# Patient Record
Sex: MALE | Race: WHITE
[De-identification: names, ages, dates, MRNs, and addresses within clinical notes are randomized per-mention and may not be internally consistent; named-entity substitution may affect disease eponyms.]

---

## 2017-03-17 ENCOUNTER — HOSPITAL ENCOUNTER (OUTPATIENT)
Dept: HOSPITAL 45 - C.LAB | Age: 67
Discharge: HOME | End: 2017-03-17
Attending: SPECIALIST
Payer: COMMERCIAL

## 2017-03-17 DIAGNOSIS — E78.5: ICD-10-CM

## 2017-03-17 DIAGNOSIS — I10: ICD-10-CM

## 2017-03-17 DIAGNOSIS — Z01.818: Primary | ICD-10-CM

## 2017-03-17 DIAGNOSIS — R35.1: ICD-10-CM

## 2017-03-17 DIAGNOSIS — K76.0: ICD-10-CM

## 2017-03-17 DIAGNOSIS — N20.0: ICD-10-CM

## 2017-03-17 DIAGNOSIS — Z11.59: ICD-10-CM

## 2017-03-17 LAB
ALBUMIN/GLOB SERPL: 1 {RATIO} (ref 0.9–2)
ALP SERPL-CCNC: 79 U/L (ref 45–117)
ALT SERPL-CCNC: 36 U/L (ref 12–78)
ANION GAP SERPL CALC-SCNC: 7 MMOL/L (ref 3–11)
AST SERPL-CCNC: 21 U/L (ref 15–37)
BASOPHILS # BLD: 0.02 K/UL (ref 0–0.2)
BASOPHILS NFR BLD: 0.3 %
BUN SERPL-MCNC: 24 MG/DL (ref 7–18)
BUN/CREAT SERPL: 25.4 (ref 10–20)
CALCIUM SERPL-MCNC: 8.8 MG/DL (ref 8.5–10.1)
CHLORIDE SERPL-SCNC: 103 MMOL/L (ref 98–107)
CHOLEST/HDLC SERPL: 3.1 {RATIO}
CO2 SERPL-SCNC: 29 MMOL/L (ref 21–32)
COMPLETE: YES
CREAT SERPL-MCNC: 0.96 MG/DL (ref 0.6–1.4)
EOSINOPHIL NFR BLD AUTO: 184 K/UL (ref 130–400)
GLOBULIN SER-MCNC: 3.8 GM/DL (ref 2.5–4)
GLUCOSE SERPL-MCNC: 92 MG/DL (ref 70–99)
GLUCOSE UR QL: 51 MG/DL
HCT VFR BLD CALC: 49 % (ref 42–52)
IG%: 0.3 %
IMM GRANULOCYTES NFR BLD AUTO: 22.3 %
INR PPP: 1 (ref 0.9–1.1)
KETONES UR QL STRIP: 82 MG/DL
LYMPHOCYTES # BLD: 1.76 K/UL (ref 1.2–3.4)
MCH RBC QN AUTO: 33.3 PG (ref 25–34)
MCHC RBC AUTO-ENTMCNC: 34.5 G/DL (ref 32–36)
MCV RBC AUTO: 96.6 FL (ref 80–100)
MONOCYTES NFR BLD: 6.4 %
NEUTROPHILS # BLD AUTO: 1.1 %
NEUTROPHILS NFR BLD AUTO: 69.6 %
NITRITE UR QL STRIP: 133 MG/DL (ref 0–150)
PARTIAL THROMBOPLASTIN RATIO: 1.1
PH UR: 160 MG/DL (ref 0–200)
PMV BLD AUTO: 10.6 FL (ref 7.4–10.4)
POTASSIUM SERPL-SCNC: 4 MMOL/L (ref 3.5–5.1)
PROTHROMBIN TIME: 11.2 SECONDS (ref 9–12)
PSA SERPL-MCNC: 0.35 NG/ML (ref 0–4)
RBC # BLD AUTO: 5.07 M/UL (ref 4.7–6.1)
SODIUM SERPL-SCNC: 139 MMOL/L (ref 136–145)
TSH SERPL-ACNC: 1.69 UIU/ML (ref 0.3–4.5)
VERY LOW DENSITY LIPOPROT CALC: 27 MG/DL
WBC # BLD AUTO: 7.91 K/UL (ref 4.8–10.8)

## 2017-03-17 NOTE — DIAGNOSTIC IMAGING REPORT
TWO VIEW CHEST



CLINICAL HISTORY: Nephrolithiasis. Preoperative examination.



FINDINGS: PA and lateral chest radiographs are compared to chest x-ray and chest

CT dated 6/9/2015. The heart is mildly enlarged. The pulmonary vasculature is

noncongested.  The lungs and pleural spaces are clear. There is no pneumothorax.

The bony thorax appears intact.



IMPRESSION: No active disease in the chest.







Electronically signed by:  Zander Dominguez M.D.

3/17/2017 11:22 AM



Dictated Date/Time:  3/17/2017 11:21 AM

## 2017-06-09 ENCOUNTER — HOSPITAL ENCOUNTER (OUTPATIENT)
Dept: HOSPITAL 45 - C.LAB | Age: 67
Discharge: HOME | End: 2017-06-09
Attending: INTERNAL MEDICINE
Payer: COMMERCIAL

## 2017-06-09 DIAGNOSIS — I10: Primary | ICD-10-CM

## 2017-06-09 LAB
ALBUMIN/GLOB SERPL: 1.2 {RATIO} (ref 0.9–2)
ALP SERPL-CCNC: 68 U/L (ref 45–117)
ALT SERPL-CCNC: 30 U/L (ref 12–78)
ANION GAP SERPL CALC-SCNC: 8 MMOL/L (ref 3–11)
AST SERPL-CCNC: 23 U/L (ref 15–37)
BUN SERPL-MCNC: 32 MG/DL (ref 7–18)
BUN/CREAT SERPL: 26.3 (ref 10–20)
CALCIUM SERPL-MCNC: 9.1 MG/DL (ref 8.5–10.1)
CHLORIDE SERPL-SCNC: 104 MMOL/L (ref 98–107)
CO2 SERPL-SCNC: 29 MMOL/L (ref 21–32)
CREAT SERPL-MCNC: 1.2 MG/DL (ref 0.6–1.4)
GLOBULIN SER-MCNC: 3.4 GM/DL (ref 2.5–4)
GLUCOSE SERPL-MCNC: 85 MG/DL (ref 70–99)
POTASSIUM SERPL-SCNC: 4 MMOL/L (ref 3.5–5.1)
SODIUM SERPL-SCNC: 141 MMOL/L (ref 136–145)

## 2017-06-13 ENCOUNTER — HOSPITAL ENCOUNTER (OUTPATIENT)
Dept: HOSPITAL 45 - C.MRI | Age: 67
Discharge: HOME | End: 2017-06-13
Attending: INTERNAL MEDICINE
Payer: COMMERCIAL

## 2017-06-13 DIAGNOSIS — R42: Primary | ICD-10-CM

## 2017-06-13 NOTE — DIAGNOSTIC IMAGING REPORT
Brain MRA



HISTORY: Mental status change  [id number]



TECHNIQUE: 3-D time-of-flight MRA of the brain was performed without contrast.



COMPARISON STUDY:  None.



FINDINGS: Visualized intracranial internal carotid arteries, distal vertebral

arteries, and basilar artery are widely patent. There is no significant

stenosis, occlusion, or aneurysm seen within the bilateral ACAs, MCAs, or PCAs.



IMPRESSION:  

No significant stenosis, occlusion, or aneurysm within the [place]. 







Electronically signed by:  Tarik Marcelo M.D.

6/13/2017 9:44 AM



Dictated Date/Time:  6/13/2017 9:40 AM

## 2017-06-16 ENCOUNTER — HOSPITAL ENCOUNTER (OUTPATIENT)
Dept: HOSPITAL 45 - C.MRIBC | Age: 67
Discharge: HOME | End: 2017-06-16
Attending: INTERNAL MEDICINE
Payer: COMMERCIAL

## 2017-06-16 DIAGNOSIS — R42: Primary | ICD-10-CM

## 2017-06-16 NOTE — DIAGNOSTIC IMAGING REPORT
MRI OF THE BRAIN WITHOUT AND WITH IV CONTRAST



CLINICAL HISTORY: VERTIGO mental status change



COMPARISON STUDY:  No previous studies for comparison. 



TECHNIQUE:  Utilizing a 1.5 Brianna magnet and dedicated coil, multiplanar,

multiecho imaging of the brain was performed pre and postcontrast

administration.  IV administration of 8 mL of Gadavist contrast was uneventful.



FINDINGS: Diffusion-weighted images are normal. Signal characteristics of the

cerebellar as well as cerebral hemispheres are unremarkable. The ventricular

system is midline. There is no postcontrast enhancement. The sella and

parasellar regions are unremarkable. Internal artery canals are symmetric.



IMPRESSION:  Normal study.







Electronically signed by:  Tarik Marcelo M.D.

6/16/2017 12:47 PM



Dictated Date/Time:  6/16/2017 12:45 PM

## 2017-07-20 ENCOUNTER — HOSPITAL ENCOUNTER (OUTPATIENT)
Dept: HOSPITAL 45 - C.PATHSPEC | Age: 67
Discharge: HOME | End: 2017-07-20
Attending: DERMATOLOGY
Payer: COMMERCIAL

## 2017-07-20 DIAGNOSIS — D23.72: Primary | ICD-10-CM

## 2017-08-11 ENCOUNTER — HOSPITAL ENCOUNTER (OUTPATIENT)
Dept: HOSPITAL 45 - C.LAB | Age: 67
Discharge: HOME | End: 2017-08-11
Attending: SPECIALIST
Payer: COMMERCIAL

## 2017-08-11 DIAGNOSIS — N20.0: Primary | ICD-10-CM

## 2017-08-11 LAB
ALBUMIN/GLOB SERPL: 1.2 {RATIO} (ref 0.9–2)
ALP SERPL-CCNC: 67 U/L (ref 45–117)
ALT SERPL-CCNC: 31 U/L (ref 12–78)
ANION GAP SERPL CALC-SCNC: 8 MMOL/L (ref 3–11)
AST SERPL-CCNC: 26 U/L (ref 15–37)
BUN SERPL-MCNC: 27 MG/DL (ref 7–18)
BUN/CREAT SERPL: 24.2 (ref 10–20)
CALCIUM SERPL-MCNC: 8.9 MG/DL (ref 8.5–10.1)
CHLORIDE SERPL-SCNC: 106 MMOL/L (ref 98–107)
CO2 SERPL-SCNC: 25 MMOL/L (ref 21–32)
CREAT SERPL-MCNC: 1.1 MG/DL (ref 0.6–1.4)
GLOBULIN SER-MCNC: 3.4 GM/DL (ref 2.5–4)
GLUCOSE SERPL-MCNC: 90 MG/DL (ref 70–99)
MAGNESIUM SERPL-MCNC: 2.2 MG/DL (ref 1.8–2.4)
PHOSPHATE SERPL-MCNC: 2 MG/DL (ref 2.5–4.9)
POTASSIUM SERPL-SCNC: 4.1 MMOL/L (ref 3.5–5.1)
SODIUM SERPL-SCNC: 139 MMOL/L (ref 136–145)
URATE SERPL-MCNC: 8.5 MG/DL (ref 2.6–7.2)

## 2017-12-08 LAB
BASOPHILS # BLD: 0.02 K/UL (ref 0–0.2)
BASOPHILS NFR BLD: 0.4 %
BUN SERPL-MCNC: 28 MG/DL (ref 7–18)
CALCIUM SERPL-MCNC: 9 MG/DL (ref 8.5–10.1)
CO2 SERPL-SCNC: 28 MMOL/L (ref 21–32)
CREAT SERPL-MCNC: 0.98 MG/DL (ref 0.6–1.4)
EOS ABS #: 0.05 K/UL (ref 0–0.5)
EOSINOPHIL NFR BLD AUTO: 152 K/UL (ref 130–400)
GLUCOSE SERPL-MCNC: 91 MG/DL (ref 70–99)
HCT VFR BLD CALC: 48.7 % (ref 42–52)
HGB BLD-MCNC: 16.4 G/DL (ref 14–18)
IG#: 0.01 K/UL (ref 0–0.02)
IMM GRANULOCYTES NFR BLD AUTO: 35.2 %
LYMPHOCYTES # BLD: 1.8 K/UL (ref 1.2–3.4)
MCH RBC QN AUTO: 33.2 PG (ref 25–34)
MCHC RBC AUTO-ENTMCNC: 33.7 G/DL (ref 32–36)
MCV RBC AUTO: 98.6 FL (ref 80–100)
MONO ABS #: 0.51 K/UL (ref 0.11–0.59)
MONOCYTES NFR BLD: 10 %
NEUT ABS #: 2.73 K/UL (ref 1.4–6.5)
NEUTROPHILS # BLD AUTO: 1 %
NEUTROPHILS NFR BLD AUTO: 53.2 %
PMV BLD AUTO: 10.7 FL (ref 7.4–10.4)
POTASSIUM SERPL-SCNC: 3.8 MMOL/L (ref 3.5–5.1)
RED CELL DISTRIBUTION WIDTH CV: 13.3 % (ref 11.5–14.5)
RED CELL DISTRIBUTION WIDTH SD: 47.8 FL (ref 36.4–46.3)
SODIUM SERPL-SCNC: 140 MMOL/L (ref 136–145)
WBC # BLD AUTO: 5.12 K/UL (ref 4.8–10.8)

## 2017-12-08 NOTE — PAT MEDICATION INSTRUCTIONS
Service Date


Dec 8, 2017.





Current Home Medication List


Acetaminophen (Tylenol Arthritis Ext Rel), 650 MG PO PRN


Allopurinol (Zyloprim), 300 MG PO QAM


Doxylamine Succinate (Sleep) (Unisom), 12.5 MG PO HS PRN for RN


Ibuprofen (Advil), 200 MG PO PRN


Lisinopril/Hctz (Prinzide 20-25MG), 0.5 TAB PO QAM


Melatonin (Melatonin Maximum Strengt), 1 TAB PO HS PRN for PRN


Multivitamin (Multivitamin), 0.5 TAB PO BID


Simvastatin (Zocor), 20 MG PO QPM


[potassium citrate], 20 MEQ PO BID





Medication Instructions


For Your Scheduled Surgery 








- Check with surgeon for instructions: 


Ibuprofen (Advil), 200 MG PO PRN








- Hold the following medications the morning of surgery:


[potassium citrate], 20 MEQ PO BID


Lisinopril/Hctz (Prinzide 20-25MG), 0.5 TAB PO QAM


Multivitamin (Multivitamin), 0.5 TAB PO BID








- Take the following medications the morning of surgery with a sip of water:


Acetaminophen (Tylenol Arthritis Ext Rel), 650 MG PO PRN (okay to take up to 4 

hours prior to surgery if needed)


Allopurinol (Zyloprim), 300 MG PO QAM








- Take the following medications as scheduled the night before surgery:


Doxylamine Succinate (Sleep) (Unisom), 12.5 MG PO HS PRN for RN (if needed)


[potassium citrate], 20 MEQ PO BID


Simvastatin (Zocor), 20 MG PO QPM


Melatonin (Melatonin Maximum Strengt), 1 TAB PO HS PRN for PRN (if needed)


Multivitamin (Multivitamin), 0.5 TAB PO BID








If you have any questions please call us at [phone] or [phone] or 

[phone]

## 2017-12-21 ENCOUNTER — HOSPITAL ENCOUNTER (OUTPATIENT)
Dept: HOSPITAL 45 - C.ACU | Age: 67
Setting detail: OBSERVATION
LOS: 1 days | Discharge: HOME | End: 2017-12-22
Attending: SURGERY | Admitting: SURGERY
Payer: COMMERCIAL

## 2017-12-21 VITALS
SYSTOLIC BLOOD PRESSURE: 153 MMHG | HEART RATE: 53 BPM | OXYGEN SATURATION: 95 % | TEMPERATURE: 97.7 F | DIASTOLIC BLOOD PRESSURE: 86 MMHG

## 2017-12-21 VITALS — HEART RATE: 48 BPM | OXYGEN SATURATION: 96 % | SYSTOLIC BLOOD PRESSURE: 163 MMHG | DIASTOLIC BLOOD PRESSURE: 88 MMHG

## 2017-12-21 VITALS
OXYGEN SATURATION: 92 % | SYSTOLIC BLOOD PRESSURE: 100 MMHG | DIASTOLIC BLOOD PRESSURE: 52 MMHG | TEMPERATURE: 98.42 F | HEART RATE: 60 BPM

## 2017-12-21 VITALS — DIASTOLIC BLOOD PRESSURE: 83 MMHG | HEART RATE: 46 BPM | SYSTOLIC BLOOD PRESSURE: 138 MMHG | OXYGEN SATURATION: 94 %

## 2017-12-21 VITALS — SYSTOLIC BLOOD PRESSURE: 137 MMHG | DIASTOLIC BLOOD PRESSURE: 78 MMHG | HEART RATE: 64 BPM | OXYGEN SATURATION: 94 %

## 2017-12-21 VITALS
SYSTOLIC BLOOD PRESSURE: 154 MMHG | HEART RATE: 62 BPM | TEMPERATURE: 97.88 F | OXYGEN SATURATION: 95 % | DIASTOLIC BLOOD PRESSURE: 72 MMHG

## 2017-12-21 VITALS — OXYGEN SATURATION: 93 % | DIASTOLIC BLOOD PRESSURE: 85 MMHG | SYSTOLIC BLOOD PRESSURE: 178 MMHG | HEART RATE: 65 BPM

## 2017-12-21 VITALS
HEIGHT: 70 IN | BODY MASS INDEX: 33.74 KG/M2 | WEIGHT: 235.67 LBS | BODY MASS INDEX: 33.74 KG/M2 | BODY MASS INDEX: 33.74 KG/M2 | WEIGHT: 235.67 LBS | HEIGHT: 70 IN

## 2017-12-21 VITALS
OXYGEN SATURATION: 92 % | TEMPERATURE: 99.14 F | HEART RATE: 73 BPM | DIASTOLIC BLOOD PRESSURE: 77 MMHG | SYSTOLIC BLOOD PRESSURE: 132 MMHG

## 2017-12-21 DIAGNOSIS — Z84.1: ICD-10-CM

## 2017-12-21 DIAGNOSIS — K40.20: Primary | ICD-10-CM

## 2017-12-21 DIAGNOSIS — Z82.49: ICD-10-CM

## 2017-12-21 DIAGNOSIS — E78.5: ICD-10-CM

## 2017-12-21 DIAGNOSIS — D17.6: ICD-10-CM

## 2017-12-21 DIAGNOSIS — E04.2: ICD-10-CM

## 2017-12-21 DIAGNOSIS — I10: ICD-10-CM

## 2017-12-21 DIAGNOSIS — G47.33: ICD-10-CM

## 2017-12-21 DIAGNOSIS — Z80.42: ICD-10-CM

## 2017-12-21 DIAGNOSIS — H91.90: ICD-10-CM

## 2017-12-21 DIAGNOSIS — M16.9: ICD-10-CM

## 2017-12-21 DIAGNOSIS — Z79.899: ICD-10-CM

## 2017-12-21 RX ADMIN — BUPIVACAINE HYDROCHLORIDE ONE ML: 5 INJECTION, SOLUTION EPIDURAL; INTRACAUDAL; PERINEURAL at 10:16

## 2017-12-21 RX ADMIN — POTASSIUM CITRATE SCH MEQ: 10 TABLET ORAL at 21:32

## 2017-12-21 RX ADMIN — BUPIVACAINE HYDROCHLORIDE ONE ML: 5 INJECTION, SOLUTION EPIDURAL; INTRACAUDAL; PERINEURAL at 10:15

## 2017-12-21 RX ADMIN — LISINOPRIL AND HYDROCHLOROTHIAZIDE SCH TAB: 25; 20 TABLET ORAL at 15:08

## 2017-12-21 NOTE — HISTORY & PHYSICAL BRIDGE NOTE
H&P Re-Evaluation


Bridge Note:


I have examined the patient, reviewed the History & Physical and in the 

interval since the performance of the History & Physical I have noted the 

following changes of clinical significance: No changes noted pt marked, SO at 

bedside, all questions answered

## 2017-12-21 NOTE — DISCHARGE INSTRUCTIONS
Discharge Instructions


Date of Service


Dec 21, 2017.





Visit


Reason for Visit:  Bilateral Inguinal Hernia





Discharge


Discharge Diagnosis / Problem:  hernia repair





Discharge Goals


Goal(s):  Decrease discomfort





Activity Recommendations


Activity Limitations:  as noted below


Lifting Limitations:  no more than 10 pounds


Shower/Bathe:  tomorrow


Driving or Machine Use:  1 week





Anesthesia


.





Post Anesthesia Instructions:





If you have had General Anesthesia or IV Sedation:





*  Do not drive today.


*  Resume driving when surgeon permits.


*  Do not make important decisions or sign legal documents today.


*  Call surgeon for:





   1.  Temperature elevations greater than 101 degrees F.


   2.  Uncontrollable pain.


   3.  Excessive bleeding.


   4.  Persistent nausea and vomiting.


   5.  Medication intolerance (nausea, vomiting or rash).





*  For nausea and vomiting use only clear liquids such as: tea, soda, bouillon 

until nausea subsides, then gradually increase diet as tolerated.





*  If you have any concerns or questions, call your surgeon's office.  If 

physician is unavailable and it is an emergency, call 911 or go to the nearest 

emergency room.





.





Instructions / Follow-Up


Instructions / Follow-Up


Dr. Hemphill in 1 week, call [phone] if you do not already have an appt or 

if you have any questions





Diet Recommendations


Recommended Home Diet:  no limitations





Pending Studies


Studies pending at discharge:  no





Medical Emergencies








.


Who to Call and When:





Medical Emergencies:  If at any time you feel your situation is an emergency, 

please call 911 immediately.





.





Non-Emergent Contact


Non-Emergency issues call your:  Surgeon


Call Non-Emergent contact if:  you have a fever, temperature is above 101.5, 

your pain is not controlled, wound has increased redness, you have any 

medication questions





.


.








"Provider Documentation" section prepared by Gallito Gonzalez.








.





[place] Drug Monitoring Program


Search Results:  no issues identified

## 2017-12-21 NOTE — OPERATIVE REPORT
DATE OF OPERATION:  12/21/2017

 

PREOPERATIVE DIAGNOSIS:  Bilateral inguinal hernia.

 

POSTOPERATIVE DIAGNOSIS:  Same with left indirect (sliding) hernia and direct

hernia, lipoma of the cord, right direct hernia and lipoma of the cord.

 

PROCEDURE:  Bilateral inguinal hernia repairs, tension free with Marlex mesh

and excision lipoma of the cord.

 

SURGEON:  Dr. Hemphill.  

 

FIRST ASSISTANT:  Micah Gonzalez PA-C.  

 

OPERATION AND FINDINGS: 

 

SUMMARY:  The patient was brought into the operating room theater, Corona

catheter had been inserted.   The abdomen was prepped with Betadine scrubbing

solution and properly draped.  We used a skin marker to lamberto the skin

incision prior to making the incision and prior to  prepping the patient. 

Incision was made first in the left lower quadrant, so the patient had more

symptomatic hernia  there, larger hernia.  The incision was approximately 4

inches long, deepened through subcutaneous tissue down to the external

oblique.  Some vessels were ligated in the subQ with 2-0 silk.  External

oblique was opened along the course of its fibers in the external ring.  What

I identified at this time was that we elevated the cord and its structures,

the patient had a significant amount of tissue going through, pretty much no

wall left in the inguinal canal and also as we dissected out, we were able to

find an indirect hernia which showed a sigmoid colon sliding component to the

area.  That opening that we had made was closed with running chromic suture,

therefore we did  not dissect out and cut  out any of the lateral attachments

to the hernial sac.  We freed it up sequentially from the surrounding tissue,

identified the cord and its structures, adherent to the hernia sac over a

Penrose drain.  Once we freed this up, we also identified the patient had a

lipoma of the cord coming through which we ligated at its  base at the

internal ring and resected it.  Once this had been completed, then we brought

in a sheet of Marlex mesh onto the field, a large sheet that we used for both

hernias.  At this point prior to repairing the left inguinal area since he

had a significant amount of direct defect we imbricated some transversalis

fibers with interrupted 3-0 silk suture to really get it  out of the way

reconstructing the internal ring pretty much that could only accommodate a

hemostat.  There was no nervous tissue on the left side that was easily

identifiable.  There was one fine nerve  that seemed to be incorporated and

strangulated as it went through the external oblique.  Rather  than leave it 

I decided to sacrifice it.  The mesh was brought up on the field and sutured

with 2-0 Prolene suture.  The symphysis pubis, shelving portion inguinal

ligament, above the conjoined tendon circumferentially around to reconstruct

the internal ring that could only accommodate the tip of a hemostat.  The

fascia pretty much was placed subfascially external oblique, 3-0 silk was

used to the external oblique fascia over the mesh.  2-0 Vicryl was used for

the subQ.  Staples we didn't use at this time, we will wait until the end to

close the skin incision.  When this was  completed the right lower quadrant 

was approached in a similar fashion using preemptive local analgesia with

0.5% Marcaine infiltrated the skin only  2 fingerbreadths medial anterior to

the iliac crest,  made an incision which we tried to make a mirror image to

the left lower quadrant, deepened through subcutaneous tissue.  Some larger

venous plexus were ligated with 2-0 silk sutures.  At this point we dissected

down onto the external oblique.  The external oblique was opened along the

course of its fibers to the external ring.  We could identify that the

patient had an incarcerated omental fat at the external ring.  This is

returned that would be a large lipoma of the cord, it actually had gone

through  and had been longstanding in the external oblique fascia and the 

cord structures.  We elevated this, we identified that the cord over a

Penrose drain, identified the patient had a direct defect.  There is no

indirect component except a lipoma which we resected,  ligating its base with

2-0 silk suture.  At this point, we then brought in a sheet of Marlex mesh

and sutured it onto the symphysis pubis, shelving portion and conjoined

tendon with 2-0 Prolene suture.  Prior to doing that, to get the direct

defect which the patient, he did not have an indirect hernia and pushed

everything into the  preperitoneal area by approximating some transversalis

fascia.  The mesh was adherent, cut tension free  type.  The external oblique

was closed  mesh and the cord structure with 2-0 silk suture, 3-0 Dexon and

staples for skin edges bilaterally.  The procedure was tolerated well by the

patient.  Estimated blood loss approximately 10 mL for both sides total and

was taken to recovery room in good condition.

 

 

I attest to the content of the Intraoperative Record and any orders documented 
therein. Any exceptions are noted below.

 

 

 

MTDD

## 2017-12-21 NOTE — [facility] OPERATIVE REPORT
Operative Report


Operative Date


Dec 21, 2017.





Pre-Operative Diagnosis





Bilateral inguinal hernias without obstruction or gangrene





Post-Operative Diagnosis





Bilateral inguinal hernias without obstruction or gangrene left direct


with indirect sliding and lipoma cord, right direct and liupoma cord





Procedure(s) Performed





Open repair left direct,  excision lipoma of cord and indirect sliding


left inguinal


hernia with mesh and open repair of direct right inguinal hernia and


direct and excision


lipoma of cord with mesh





Surgeon


Dr. Ren Hemphill





Assistant Surgeon(s)


Gallito Gonzalez PA-C





Estimated Blood Loss


10 mL





Findings


large left direct and sliding ind hernia and lipoma cord, right direct and 

lipoma cord





Specimens





Microbiology





#1. Cath, Urine





Routine C/S, Gram Stain, Anerobic/Aerobic, Fungal/Yeast





Sent to lab at 0840, carried by OR aide.











Permanent specimens





A: Left Inguinal Hernia Sac





B: Lipoma of Cord, Right





Anesthesia


local block .5 % marcAINE PLAIN (30CC TOTAL) AND GENERAL ANESTHESIA


I attest to the content of the Intraoperative Record and any orders documented 

therein.  Any exceptions are noted below.

## 2017-12-21 NOTE — NUR
OBS/ID: Patient is alert and oriented. Vitals stable on RA. Disconnected IVF per order. 
Patient is voiding adequately. (I) in room without concern. Tolerating diet without issue. B 
groin dressing intact, R dressing without noted drainage which seems to be 
controlled/stopping as drainage is darkening/drying. Denies pain complaints. Likely 
discharge to home tomorrow. Will continue to monitor.

## 2017-12-21 NOTE — ANESTHESIOLOGY PROGRESS NOTE
Anesthesia Post Op Note


Date & Time


Dec 21, 2017 at 11:07





Vital Signs


Pain Intensity:  0





Vital Signs Past 12 Hours








  Date Time  Temp Pulse Resp B/P (MAP) Pulse Ox O2 Delivery O2 Flow Rate FiO2


 


12/21/17 11:00  52 12 133/76 98 Oxymask 3 


 


12/21/17 10:50  54 12 147/78 97 Oxymask 3 


 


12/21/17 10:40  59 12 138/76 95 Oxymask 5 


 


12/21/17 10:30 36.4 62 12 140/82 99 Oxymask 5 


 


12/21/17 05:42 36.6 62 18 154/72 (99) 95 Room Air  











Notes


Mental Status:  alert / awake / arousable, participated in evaluation


Pt Amnestic to Procedure:  Yes


Nausea / Vomiting:  adequately controlled


Pain:  adequately controlled


Airway Patency, RR, SpO2:  stable & adequate


BP & HR:  stable & adequate


Hydration State:  stable & adequate


Anesthetic Complications:  no major complications apparent

## 2017-12-22 VITALS
OXYGEN SATURATION: 93 % | HEART RATE: 60 BPM | TEMPERATURE: 98.6 F | DIASTOLIC BLOOD PRESSURE: 55 MMHG | SYSTOLIC BLOOD PRESSURE: 111 MMHG

## 2017-12-22 VITALS
SYSTOLIC BLOOD PRESSURE: 126 MMHG | OXYGEN SATURATION: 92 % | TEMPERATURE: 98.6 F | HEART RATE: 67 BPM | DIASTOLIC BLOOD PRESSURE: 64 MMHG

## 2017-12-22 VITALS
SYSTOLIC BLOOD PRESSURE: 126 MMHG | DIASTOLIC BLOOD PRESSURE: 64 MMHG | OXYGEN SATURATION: 92 % | HEART RATE: 67 BPM | TEMPERATURE: 98.6 F

## 2017-12-22 RX ADMIN — LISINOPRIL AND HYDROCHLOROTHIAZIDE SCH TAB: 25; 20 TABLET ORAL at 08:34

## 2017-12-22 RX ADMIN — POTASSIUM CITRATE SCH MEQ: 10 TABLET ORAL at 08:32

## 2017-12-22 RX ADMIN — OXYCODONE HYDROCHLORIDE AND ACETAMINOPHEN PRN TAB: 5; 325 TABLET ORAL at 00:13

## 2017-12-22 RX ADMIN — OXYCODONE HYDROCHLORIDE AND ACETAMINOPHEN PRN TAB: 5; 325 TABLET ORAL at 04:16

## 2017-12-22 NOTE — SURGERY PROGRESS NOTE
Surgery Progress Note


Date of Service


Dec 22, 2017.





Subjective


Post OP Day:  1


+ feeling well, + pain controlled, No nausea





Objective


Vital Signs:











  Date Time  Temp Pulse Resp B/P (MAP) Pulse Ox O2 Delivery O2 Flow Rate FiO2


 


12/22/17 03:22 37.0 60 16 111/55 (73) 93 Room Air  


 


12/22/17 00:20      CPAP  


 


12/21/17 23:08 36.9 60 16 100/52 (68) 92 CPAP  


 


12/21/17 18:59 37.3 73 16 132/77 (95) 92 Room Air  


 


12/21/17 15:30      Room Air  


 


12/21/17 14:52  64 16 137/78 (97) 94 Room Air  


 


12/21/17 13:51  65 18 178/85 (116) 93 Nasal Cannula  


 


12/21/17 12:52  48 16 163/88 (113) 96 Nasal Cannula 2.0 


 


12/21/17 12:16  46 19 138/83 (101) 94 Nasal Cannula 2.0 


 


12/21/17 11:45     95 Nasal Cannula 2.0 


 


12/21/17 11:45 36.5 53 16 153/86 (108) 95 Nasal Cannula 2.0 


 


12/21/17 11:20  52 16 124/66 97 Nasal Cannula 2 


 


12/21/17 11:10 36.5 48 14 133/76 97 Nasal Cannula 2 


 


12/21/17 11:00  52 12 133/76 98 Oxymask 3 


 


12/21/17 10:50  54 12 147/78 97 Oxymask 3 


 


12/21/17 10:40  59 12 138/76 95 Oxymask 5 


 


12/21/17 10:30 36.4 62 12 140/82 99 Oxymask 5 








Abdomen:  non distended, soft


Incision(s):  clean, dry


Laboratory Results:





Results Past 24 Hours








Test


  12/21/17


17:00 Range/Units


 


 


Urine Color YELLOW  


 


Urine Appearance CLEAR CLEAR  


 


Urine pH 7.0 4.5-7.5  


 


Urine Specific Gravity 1.008 1.000-1.030  


 


Urine Protein NEG NEG  


 


Urine Glucose (UA) NEG NEG  


 


Urine Ketones NEG NEG  


 


Urine Occult Blood 1+ NEG  


 


Urine Nitrite NEG NEG  


 


Urine Bilirubin NEG NEG  


 


Urine Urobilinogen NEG NEG  


 


Urine Leukocyte Esterase NEG NEG  


 


Urine WBC (Auto) 0 0-5  /hpf


 


Urine RBC (Auto) 0-4 0-4  /hpf


 


Urine Hyaline Casts (Auto) 0 0-5  /lpf


 


Urine Epithelial Cells (Auto) 0-5 0-5  /lpf


 


Urine Bacteria (Auto) NEG NEG  








Microbiology Results


12/21/17 Fungal Smear - Final, Resulted


           


12/21/17 Fungal Culture, Resulted


           Pending


12/21/17 Urine Culture, Received


           Pending





Assessment & Plan


s/p bilat open inguinal hernia repairs


   tolerating po analgesics


   ambulating


   ok for discharge

## 2017-12-22 NOTE — NUR
OBS: Patient resting in bed at this time. Pain managed with PO medications. Call bell in 
reach, will continue to monitor.

## 2017-12-22 NOTE — NUR
OBS: A&Ox4, denies need for pain medication.  Room air.  Tolerating diet.  Voiding without 
difficulty.  Independent in room.  Saline locked.  Incisions are intact, healing.  Awaiting 
discharge paperwork.  Call bell in reach.  Will monitor for changes.

## 2017-12-22 NOTE — NUR
OBS: Patient alert and oriented x4. Saline locked. Pain managed with PO medications. OOB 
independently. Call bell in reach, will continue to monitor.

## 2017-12-22 NOTE — ANESTHESIOLOGY PROGRESS NOTE
Anesthesia Post Op Note


Date & Time


Dec 22, 2017 at 08:29





Vital Signs


Pain Intensity:  7.0





Vital Signs Past 12 Hours








  Date Time  Temp Pulse Resp B/P (MAP) Pulse Ox O2 Delivery O2 Flow Rate FiO2


 


12/22/17 07:15      Room Air  


 


12/22/17 07:11 37.0 67 18 126/64 (84) 92 Room Air  


 


12/22/17 03:22 37.0 60 16 111/55 (73) 93 Room Air  


 


12/22/17 00:20      CPAP  


 


12/21/17 23:08 36.9 60 16 100/52 (68) 92 CPAP  











Notes


Mental Status:  alert / awake / arousable, participated in evaluation


Pt Amnestic to Procedure:  Yes


Nausea / Vomiting:  adequately controlled


Pain:  adequately controlled


Airway Patency, RR, SpO2:  stable & adequate


BP & HR:  stable & adequate


Hydration State:  stable & adequate


Anesthetic Complications:  no major complications apparent

## 2017-12-22 NOTE — DISCHARGE INSTRUCTIONS
Discharge Instructions


Date of Service


Dec 22, 2017.





Admission


Reason for Admission:  Bilateral Inguinal Hernia





Discharge


Discharge Diagnosis / Problem:  bilateral inguinal hernia repair





Discharge Goals


Goal(s):  Decrease discomfort





Activity Recommendations


Activity Limitations:  as noted below


Lifting Limitations:  no more than 10 pounds


Shower/Bathe:  no limitations (ok to shower today)


Driving or Machine Use:  1 week





.





Instructions / Follow-Up


Instructions / Follow-Up


Dr. Hemphill in approx 1 week, call [phone] if you do not already have an 

appt or have any questions





Current Hospital Diet


Patient's current hospital diet: AHA Diet (Heart Healthy)





Discharge Diet


Recommended Diet:  Regular Diet





Procedures


Procedures Performed:  


Open repair left direct,  excision lipoma of cord and indirect sliding


left inguinal


hernia with mesh and open repair of direct right inguinal hernia and


direct and excision


lipoma of cord with mesh





Pending Studies


Studies pending at discharge:  no





Medical Emergencies








.


Who to Call and When:





Medical Emergencies:  If at any time you feel your situation is an emergency, 

please call 911 immediately.





.





Non-Emergent Contact


Non-Emergency issues call your:  Surgeon


Call Non-Emergent contact if:  you have a fever, temperature is above 101.5, 

your pain is not controlled, wound has increased redness, you have any 

medication questions


.








"Provider Documentation" section prepared by Gallito Gonzalez.








.





VTE Core Measure


Inpt VTE Proph given/why not?:  SCD's

## 2017-12-22 NOTE — SURGERY PROGRESS NOTE
DATE: 12/22/2017

 

DATE:  12/22/2017  

 

Lazaro is first  postoperative day status post bilateral open inguinal hernia

repair.  He is resting comfortably, having minimal discomfort.  His last

vitals showed a temperature of 37, pulse 67, respirations 18, blood pressure

126/64, O2 sats 92 on room air.  He is voiding without any problem.  The

abdomen is benign.  The incisions are free of any drainage.  He does have a

little reaction, probably inferior to both distal aspects of the incision,

may be associated where we used  local anesthetic, but nothing significant 

as far as any sign of infection.  As stated though pain free.  The

intraoperative findings were discussed with the patient.  At this point, he

can be discharged.  Instructions were given regarding wound care, diet,

activity, and followup.

## 2017-12-26 NOTE — DISCHARGE SUMMARY
PRIMARY DISCHARGE DIAGNOSIS:

1. Bilateral inguinal hernias.



SECONDARY DISCHARGE DIAGNOSES:

1. Hypertension.

2. Sleep apnea.

 

PROCEDURE PERFORMED:  Bilateral inguinal hernia repair with mesh.

 

HOSPITAL COURSE:  The patient is a 67-year-old male brought in through same

day and taken to the operating room for bilateral open inguinal hernia

repairs.  He had a large indirect hernia on the left and a right direct

inguinal hernia.  The procedure was well tolerated.  He was transferred to

the surgical floor for overnight observation.  On postoperative day 1, he was

tolerating diet and oral analgesics.  Incisions were clean and dry.  He was

stable for discharge.

 

DISCHARGE INSTRUCTIONS:  Discharge home.  Follow up with Dr. Hemphill in 1

week.

 

DISCHARGE MEDICATIONS:  Percocet 1-2 tablets every 4 hours as needed.  Hold

additional Tylenol Arthritis until he is  finished with the Percocet.  Resume

other home medications allopurinol 300 mg daily, Unisom 12.5 mg at bedtime as

needed, ibuprofen 200 mg as needed, Prinzide 20/25 mg 1/2 tablet daily,

melatonin 1 tablet at bedtime as needed, daily multivitamin, Zocor 20 mg

daily, and potassium citrate 20 mEq b.i.d.

 

 

 

MTDD

## 2018-02-21 ENCOUNTER — HOSPITAL ENCOUNTER (OUTPATIENT)
Dept: HOSPITAL 45 - C.ULTRBC | Age: 68
Discharge: HOME | End: 2018-02-21
Attending: INTERNAL MEDICINE
Payer: COMMERCIAL

## 2018-02-21 DIAGNOSIS — E04.2: Primary | ICD-10-CM

## 2018-02-21 NOTE — DIAGNOSTIC IMAGING REPORT
SOFT TISS HEAD/NECK-THYROID



HISTORY: Goiter  NON TOXIC MULTINODULAR GOITER



COMPARISON:  1/2/2015



FINDINGS:



Right lobe:  Diffuse heterogeneous multinodular gland. Mild increase in maximum

dimension to 10.5 cm. Largest right thyroid nodule measures approximately 4.9 cm

at maximum which is generally similar compared to the prior study.



Left lobe:  Diffuse heterogeneous multinodular configuration. Slight increase in

maximum dimension compared to the prior study currently at 8.0 cm. This is

increased from 6.8 cm. Multinodular appearance with the largest measuring 2.3

cm. This is similar compared to the prior study.



Isthmus:  No nodules.



IMPRESSION:  



1. Diffuse heterogeneous and multinodular thyroid showing a slight increase in

overall volume compared to the prior study.





2. No major change in the diffuse nodularity compared to the prior exam.









The above report was generated using voice recognition software.  It may contain

grammatical, syntax or spelling errors.







Electronically signed by:  Tarik Marcelo M.D.

2/21/2018 10:29 AM



Dictated Date/Time:  2/21/2018 10:26 AM

## 2018-03-12 ENCOUNTER — HOSPITAL ENCOUNTER (OUTPATIENT)
Dept: HOSPITAL 45 - C.PATHSPEC | Age: 68
Discharge: HOME | End: 2018-03-12
Attending: DERMATOLOGY
Payer: COMMERCIAL

## 2018-03-12 DIAGNOSIS — L57.0: Primary | ICD-10-CM
